# Patient Record
Sex: FEMALE | Race: WHITE
[De-identification: names, ages, dates, MRNs, and addresses within clinical notes are randomized per-mention and may not be internally consistent; named-entity substitution may affect disease eponyms.]

---

## 2018-10-13 ENCOUNTER — HOSPITAL ENCOUNTER (EMERGENCY)
Dept: HOSPITAL 80 - FED | Age: 23
Discharge: HOME | End: 2018-10-13
Payer: COMMERCIAL

## 2018-10-13 VITALS — DIASTOLIC BLOOD PRESSURE: 74 MMHG | SYSTOLIC BLOOD PRESSURE: 138 MMHG

## 2018-10-13 DIAGNOSIS — R44.3: ICD-10-CM

## 2018-10-13 DIAGNOSIS — T43.205A: ICD-10-CM

## 2018-10-13 DIAGNOSIS — F41.9: ICD-10-CM

## 2018-10-13 DIAGNOSIS — G25.1: Primary | ICD-10-CM

## 2018-10-13 NOTE — EDPHY
H & P


Stated Complaint: Shaking, nausea, hallucinations after taking medication last 

night


Time Seen by Provider: 10/13/18 18:00


HPI/ROS: 





CHIEF COMPLAINT:  Hallucinations, shakiness





HISTORY OF PRESENT ILLNESS:  23-year-old female with anxiety presents with 

hallucinations and shakiness.  She saw her primary care physician for anxiety 

early this week.  She received a prescription for Effexor.  She took a 1st dose 

of extended release Effexor yesterday evening.  At 2:00 a.m., she awoke with 

hallucinations and feeling very shaky.  The hallucinations have resolved, but 

she continues to feel quite anxious and shaky.  No tachycardia, shortness of 

breath or other symptoms.  No prior psychiatric medications.





REVIEW OF SYSTEMS:  complete 10 point ROS reviewed and is negative except for 

the noted elements in the HPI








- Personal History


LMP (Females 10-55): 1-7 Days Ago


Current Tetanus/Diphtheria Vaccine: Yes


Current Tetanus Diphtheria and Acellular Pertussis (TDAP): Yes





- Medical/Surgical History


Hx Asthma: No


Hx Chronic Respiratory Disease: No


Hx Diabetes: No


Hx Cardiac Disease: No


Hx Renal Disease: No


Hx Cirrhosis: No


Hx Alcoholism: No


Hx HIV/AIDS: No


Hx Splenectomy or Spleen Trauma: No


Other PMH: anxiety, IBS





- Social History


Smoking Status: Never smoked


Alcohol Use: Sober


Drug Use: None


Additional Social History: 





Single








- Physical Exam


Exam: 





General Appearance:  Alert, pleasant


Eyes:  Pupils equal and round, no conjunctival pallor or injection


ENT, Mouth:  Mucous membranes moist


Neck:  Normal inspection


Respiratory:  Lungs are clear to auscultation


Cardiovascular:  Regular rate and rhythm


Gastrointestinal:  Abdomen is soft and nontender


Neurological:  A&O, nonfocal, normal gait


Skin:  Warm and dry, no rash


Extremities:  Nontender, no pedal edema


Psychiatric:  Anxious





Constitutional: 


 Initial Vital Signs











Temperature (C)  37.1 C   10/13/18 17:48


 


Heart Rate  78   10/13/18 17:48


 


Respiratory Rate  16   10/13/18 17:48


 


Blood Pressure  136/86 H  10/13/18 17:48


 


O2 Sat (%)  98   10/13/18 17:48








 











O2 Delivery Mode               Room Air














Allergies/Adverse Reactions: 


 





No Known Allergies Allergy (Verified 10/13/18 17:50)


 








Home Medications: 














 Medication  Instructions  Recorded


 


Ativan  10/13/18


 


Venlafaxine HCl ER  10/13/18














Medical Decision Making


ED Course/Re-evaluation: 





This patient presents after an adverse reaction to Effexor.  She will discard 

Effexor and list this as an allergy.  She took her own Ativan 0.5mg orally.  

Follow-up with primary care physician to discuss alternative medications.








Departure





- Departure


Disposition: Home, Routine, Self-Care


Clinical Impression: 


Medication reaction


Qualifiers:


 Encounter type: initial encounter Qualified Code(s): T50.905A - Adverse effect 

of unspecified drugs, medicaments and biological substances, initial encounter





Condition: Good


Instructions:  Adverse Drug Reaction (ED)


Additional Instructions: 


Take Ativan as prescribed.


Call your physician on Monday to discuss alternative anti anxiety medications.


Discard Effexor and list this as an allergy.





Referrals: 


[facility] ,. [Edm Groups for Call Sched] - As per Instructions